# Patient Record
Sex: FEMALE | Race: OTHER | NOT HISPANIC OR LATINO | Employment: UNEMPLOYED | URBAN - METROPOLITAN AREA
[De-identification: names, ages, dates, MRNs, and addresses within clinical notes are randomized per-mention and may not be internally consistent; named-entity substitution may affect disease eponyms.]

---

## 2017-01-04 ENCOUNTER — OFFICE VISIT (OUTPATIENT)
Dept: MEDICAL GROUP | Facility: MEDICAL CENTER | Age: 27
End: 2017-01-04
Attending: NURSE PRACTITIONER
Payer: MEDICAID

## 2017-01-04 VITALS
BODY MASS INDEX: 21.71 KG/M2 | DIASTOLIC BLOOD PRESSURE: 68 MMHG | SYSTOLIC BLOOD PRESSURE: 100 MMHG | WEIGHT: 115 LBS | HEIGHT: 61 IN | HEART RATE: 88 BPM | OXYGEN SATURATION: 98 % | TEMPERATURE: 97.6 F | RESPIRATION RATE: 20 BRPM

## 2017-01-04 DIAGNOSIS — Z30.011 ENCOUNTER FOR INITIAL PRESCRIPTION OF CONTRACEPTIVE PILLS: ICD-10-CM

## 2017-01-04 LAB
INT CON NEG: NORMAL
INT CON POS: NORMAL
POC URINE PREGNANCY TEST: NORMAL

## 2017-01-04 PROCEDURE — 99203 OFFICE O/P NEW LOW 30 MIN: CPT | Mod: 25 | Performed by: NURSE PRACTITIONER

## 2017-01-04 PROCEDURE — 99203 OFFICE O/P NEW LOW 30 MIN: CPT | Performed by: NURSE PRACTITIONER

## 2017-01-04 PROCEDURE — 81025 URINE PREGNANCY TEST: CPT | Performed by: NURSE PRACTITIONER

## 2017-01-04 RX ORDER — NORETHINDRONE ACETATE AND ETHINYL ESTRADIOL 1MG-20(21)
1 KIT ORAL DAILY
Qty: 28 TAB | Refills: 11 | Status: SHIPPED | OUTPATIENT
Start: 2017-01-04 | End: 2017-12-13 | Stop reason: SDUPTHER

## 2017-01-04 ASSESSMENT — PATIENT HEALTH QUESTIONNAIRE - PHQ9: CLINICAL INTERPRETATION OF PHQ2 SCORE: 0

## 2017-01-04 NOTE — PROGRESS NOTES
Chief Complaint   Patient presents with   • Establish Care   • Contraception     discuss birth control     Shanique Garcia is a 26 y.o. female here to establish care.    HPI:  The patient is in the clinic today to establish care. She states she has been healthy throughout her lifetime and has no chronic medical problems. She has not had regular care with a PCP. 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 She reports she had 3 healthy pregnancies. She lives with her SO and children.      She is interested in starting on birth control pills today. She is in a committed relationship but states that she does not desire to become pregnant for at least 3 years. She has been on pills in the past and this worked well for her. She states her LMP was 12.17.16 but she has had unprotected sex since this time. She is a nonsmoker. She has no personal or family history of thromboembolism. Last Pap smear 3/2015-no endocervical cells present but otherwise normal. She states she is still breastfeeding her youngest son who is about 16 months old. She does plan to wean him soon as she wants to return to work part time.        Current medicines (including changes today)  No current outpatient prescriptions on file.     No current facility-administered medications for this visit.     She  has a past medical history of Pyelonephritis affecting pregnancy in third trimester (7/3/2015). She also has no past medical history of Addisons disease ([facility]), Adrenal disorder ([facility]), Allergy, Anemia, Anxiety, Arrhythmia, Arthritis, ASTHMA, Blood transfusion, Cancer ([facility]), CATARACT, CHF (congestive heart failure) ([facility]), Clotting disorder ([facility]), COPD, Cushings syndrome ([facility]), Depression, Diabetes, Diabetic neuropathy ([facility]), EMPHYSEMA, GERD (gastroesophageal reflux disease), Glaucoma, Goiter, Headache(784.0), Heart attack ([facility]), Heart murmur, HIV (human immunodeficiency virus infection), Hyperlipidemia, Parathyroid disorder ([facility]),  "Hypertension, IBD (inflammatory bowel disease), Meningitis, Migraine, Muscle disorder, OSTEOPOROSIS, Pituitary disease (HCC), Seizure (HCC), Sickle cell disease (HCC), Stroke (HCC), Substance abuse, Thyroid disease, Tuberculosis, Ulcer (HCC), or Urinary tract infection, site not specified.  She  has no past surgical history on file.  Social History   Substance Use Topics   • Smoking status: Never Smoker    • Smokeless tobacco: Never Used   • Alcohol Use: No     Social History     Social History Narrative     Family History   Problem Relation Age of Onset   • Cancer Maternal Grandfather      lung cancer    • Diabetes Neg Hx    • Heart Disease Neg Hx    • Stroke Neg Hx      Family Status   Relation Status Death Age   • Maternal Grandfather     • Mother Alive    • Father Alive    • Brother Alive    • Maternal Grandmother Alive    • Paternal Grandmother Alive    • Paternal Grandfather           No current medications. NKDA.      ROS:   No fever, chills, sweats.  No polydipsia, polyuria, temperature intolerance, significant weight changes  No visual changes, blurred vision.  No chest pain, palpitations, peripheral swelling  No chronic cough, shortness of breath, dyspnea with exertion.  No dysphagia, odynophagia, black or bloody stools.  No abdominal pain, nausea, persistent diarrhea, constipation.   No dysuria, nocturia, hematuria, incontinence.  No rash, pruritis, pigment changes.  No persistent swollen glands, unusual bruising, lymphedema.  No focal weakness, syncope.  No joint swelling, muscle weakness or spasm  No chronic insomnia, depression, anxiety or other mood disorder.           Objective:     Blood pressure 100/68, pulse 88, temperature 36.4 °C (97.6 °F), resp. rate 20, height 1.549 m (5' 1\"), weight 52.164 kg (115 lb), last menstrual period 2016, SpO2 98 %, currently breastfeeding. Body mass index is 21.74 kg/(m^2).  Physical Exam:    Alert, oriented in no acute distress.  Eye contact is " good, speech goal directed, affect bright.  HEENT: EOMI, PERRL, conjunctiva non-injected, sclera non-icteric.  Marie pinnae, external auditory canals, TM pearly gray with normal light reflex bilaterally.  Oral mucous membranes pink and moist with no lesions.  Neck supple with no cervical lymphadenopathy, JVD, palpable thyroid nodules   Lungs: clear to auscultation bilaterally with good excursion.  CV: regular rate and rhythm.  Abdomen soft, non-distended, non-tender with normal bowel sounds. No CVAT. Striae noted from previous pregnancies.  Lower extremities color normal, vascularity normal, no edema, temperature normal  Skin: no rashes or lesions in visible areas.  MSK: full ROM in 4 extremities. Normal gait. No joint swelling/deformity.       Urine pregnancy test done in clinic-negative.      Assessment and Plan:   The following treatment plan was discussed     1. Encounter for initial prescription of contraceptive pills  Options for birth control discussed. She elects to restart pill.  She is still breastfeeding but her menstrual cycle has resumed and she plans to wean soon so combined pill will be chosen. She was educated that this may decrease her mild supply some. She is okay with this if it occurs.    Rx for Loestrin given today.  Discussed dosing/side effects. Pt instructed to call clinic with any adverse effects    Instructions (including written) were given on different start methods and need for backup contraception depending on start method.    She was educated that OCPs do not protect against STIs and she needs to continue to use condoms with sexual activity.  She was told that the OCPs will not be effective for 7 days after starting.  She was told that some medications can alter the effectiveness of OCPs and she is to discuss this with a pharmacist if she is prescribed a medication/antibiotic.  She was educated that abnormal bleeding or spotting is common in the first couple of months on birth  control.  She was educated in risk and s/s of thromboembolism and understands that she is to seek emergency care if these occur.    norethindrone-ethinyl estradiol (LOESTRIN FE 1/20) 1-20 MG-MCG per tablet         She will be scheduled to update Pap smear.      Followup: Return in about 4 weeks (around 2/1/2017), or if symptoms worsen or fail to improve.         Please note that this dictation was created using voice recognition software. I have made every reasonable attempt to correct obvious errors, but I expect that there are errors of grammar and possibly content that I did not discover before finalizing the note.

## 2017-01-04 NOTE — PATIENT INSTRUCTIONS
Please take all medications as instructed.  If you have any adverse reactions, please report them immediately.  Please follow all instructions that were discussed in the visit.  Please complete any ordered blood work prior to next visit.  Please follow up with any referrals made.

## 2017-01-04 NOTE — MR AVS SNAPSHOT
"Shanique Garcia   2017 9:10 AM   Office Visit   MRN: [id number]    [facility]   Dept Phone:  [phone]    Description:  Female : 1990   Provider:  DAILY Rivera           Reason for Visit     Establish Care     Contraception discuss birth control      Allergies as of 2017     No Known Allergies      You were diagnosed with     Encounter for initial prescription of contraceptive pills   [280862]         Vital Signs     Blood Pressure Pulse Temperature Respirations Height Weight    100/68 mmHg 88 36.4 °C (97.6 °F) 20 1.549 m (5' 1\") 52.164 kg (115 lb)    Body Mass Index Oxygen Saturation Last Menstrual Period Breastfeeding? Smoking Status       21.74 kg/m2 98% 2016 Yes Never Smoker        Basic Information     Date Of Birth Sex Race Ethnicity Preferred Language    1990 Female Other Non- English      Your appointments     2017  9:10 AM   Established Patient with DAILY Rivera   [facility] ([facility])    [address] [place] [id number]   [phone]           You will be receiving a confirmation call a few days before your appointment from our automated call confirmation system.              Health Maintenance        Date Due Completion Dates    IMM HEP B VACCINE (1 of 3 - Primary Series) 1990 ---    IMM HEP A VACCINE (1 of 2 - Standard Series) 10/10/1993 ---    IMM HPV VACCINE (1 of 3 - Female 3 Dose Series) 10/10/2003 ---    IMM VARICELLA (CHICKENPOX) VACCINE (1 of 2 - 2 Dose Adolescent Series) 10/10/2005 ---    IMM INFLUENZA (1) 2016 11/3/2013    PAP SMEAR 3/19/2018 3/19/2015    IMM DTaP/Tdap/Td Vaccine (2 - Td) 2025, 11/3/2013            Current Immunizations     Influenza Vaccine Quad Inj (Pf) 11/3/2013  9:15 AM    Tdap Vaccine 2015, 11/3/2013  9:15 AM      Below and/or attached are the medications your provider expects you to take. Review all of your home " medications and newly ordered medications with your provider and/or pharmacist. Follow medication instructions as directed by your provider and/or pharmacist. Please keep your medication list with you and share with your provider. Update the information when medications are discontinued, doses are changed, or new medications (including over-the-counter products) are added; and carry medication information at all times in the event of emergency situations     Allergies:  No Known Allergies          Medications  Valid as of: January 04, 2017 -  9:20 AM    Generic Name Brand Name Tablet Size Instructions for use    Norethin Ace-Eth Estrad-FE (Tab) JUNEL FE 1/20 1-20 MG-MCG Take 1 Tab by mouth every day.        .                 Medicines prescribed today were sent to:     [facility] [address] [place] [address], [place] [address]     [address] [facility] [place] [address]    Phone: [phone] Fax: [phone]    Open 24 Hours?: No      Medication refill instructions:       If your prescription bottle indicates you have medication refills left, it is not necessary to call your provider’s office. Please contact your pharmacy and they will refill your medication.    If your prescription bottle indicates you do not have any refills left, you may request refills at any time through one of the following ways: The online Takeaway.[email or web address] system (except Urgent Care), by calling your provider’s office, or by asking your pharmacy to contact your provider’s office with a refill request. Medication refills are processed only during regular business hours and may not be available until the next business day. Your provider may request additional information or to have a follow-up visit with you prior to refilling your medication.   *Please Note: Medication refills are assigned a new Rx number when refilled electronically. Your pharmacy may indicate that no refills were authorized even though a new prescription for the same medication is available at the  pharmacy. Please request the medicine by name with the pharmacy before contacting your provider for a refill.           [employer] Access Code: [email or web address]  Expires: 2/3/2017  9:20 AM    [employer]  A secure, online tool to manage your health information     [employer]’s [employer]® is a secure, online tool that connects you to your personalized health information from the privacy of your home -- day or night - making it very easy for you to manage your healthcare. Once the activation process is completed, you can even access your medical information using the [employer] rosaura, which is available for free in the Apple Rosaura store or Google Play store.     [employer] provides the following levels of access (as shown below):   My Chart Features   [facility]  Specialists [facility]  Urgent  Care [facility]  Primary Care  Doctor   Email your healthcare team securely and privately 24/7 X X X    Manage appointments: schedule your next appointment; view details of past/upcoming appointments X      Request prescription refills. X      View recent personal medical records, including lab and immunizations X X X X   View health record, including health history, allergies, medications X X X X   Read reports about your outpatient visits, procedures, consult and ER notes X X X X   See your discharge summary, which is a recap of your hospital and/or ER visit that includes your diagnosis, lab results, and care plan. X X       How to register for [employer]:  1. Go to  [email or web address].  2. Click on the Sign Up Now box, which takes you to the New Member Sign Up page. You will need to provide the following information:  a. Enter your [employer] Access Code exactly as it appears at the top of this page. (You will not need to use this code after you’ve completed the sign-up process. If you do not sign up before the expiration date, you must request a new code.)   b. Enter your date of birth.   c. Enter your home email  address.   d. Click Submit, and follow the next screen’s instructions.  3. Create a [employer] ID. This will be your Sidewalk login ID and cannot be changed, so think of one that is secure and easy to remember.  4. Create a [employer] password. You can change your password at any time.  5. Enter your Password Reset Question and Answer. This can be used at a later time if you forget your password.   6. Enter your e-mail address. This allows you to receive e-mail notifications when new information is available in Sidewalk.  7. Click Sign Up. You can now view your health information.    For assistance activating your Sidewalk account, call [phone]

## 2017-02-08 ENCOUNTER — OFFICE VISIT (OUTPATIENT)
Dept: MEDICAL GROUP | Facility: MEDICAL CENTER | Age: 27
End: 2017-02-08
Attending: NURSE PRACTITIONER
Payer: MEDICAID

## 2017-02-08 ENCOUNTER — HOSPITAL ENCOUNTER (OUTPATIENT)
Facility: MEDICAL CENTER | Age: 27
End: 2017-02-08
Attending: NURSE PRACTITIONER
Payer: MEDICAID

## 2017-02-08 VITALS
HEIGHT: 61 IN | WEIGHT: 116 LBS | TEMPERATURE: 98.4 F | HEART RATE: 100 BPM | BODY MASS INDEX: 21.9 KG/M2 | SYSTOLIC BLOOD PRESSURE: 98 MMHG | RESPIRATION RATE: 14 BRPM | DIASTOLIC BLOOD PRESSURE: 60 MMHG | OXYGEN SATURATION: 96 %

## 2017-02-08 DIAGNOSIS — Z12.4 SCREENING FOR CERVICAL CANCER: ICD-10-CM

## 2017-02-08 DIAGNOSIS — Z01.419 ENCOUNTER FOR GYNECOLOGICAL EXAMINATION WITHOUT ABNORMAL FINDING: ICD-10-CM

## 2017-02-08 DIAGNOSIS — Z30.41 ORAL CONTRACEPTIVE PILL SURVEILLANCE: ICD-10-CM

## 2017-02-08 LAB — CYTOLOGY REG CYTOL: NORMAL

## 2017-02-08 PROCEDURE — G0101 CA SCREEN;PELVIC/BREAST EXAM: HCPCS | Performed by: NURSE PRACTITIONER

## 2017-02-08 PROCEDURE — 88175 CYTOPATH C/V AUTO FLUID REDO: CPT

## 2017-02-08 PROCEDURE — 99212 OFFICE O/P EST SF 10 MIN: CPT | Performed by: NURSE PRACTITIONER

## 2017-02-08 ASSESSMENT — PAIN SCALES - GENERAL: PAINLEVEL: NO PAIN

## 2017-02-08 NOTE — MR AVS SNAPSHOT
"        Shanique Garcia   2017 9:10 AM   Office Visit   MRN: [id number]    Department[facility]   Dept Phone:  [phone]    Description:  Female : 1990   Provider:  DAILY Rivera           Reason for Visit     Gynecologic Exam           Allergies as of 2017     No Known Allergies      You were diagnosed with     Encounter for gynecological examination without abnormal finding   [953789]       Screening for cervical cancer   [507508]       Oral contraceptive pill surveillance   [[id number]]         Vital Signs     Blood Pressure Pulse Temperature Respirations Height Weight    98/60 mmHg 100 36.9 °C (98.4 °F) 14 1.549 m (5' 1\") 52.617 kg (116 lb)    Body Mass Index Oxygen Saturation Last Menstrual Period Breastfeeding? Smoking Status       21.93 kg/m2 96% 2017 No Never Smoker        Basic Information     Date Of Birth Sex Race Ethnicity Preferred Language    1990 Female Other Non- English      Problem List              ICD-10-CM Priority Class Noted - Resolved    Oral contraceptive pill surveillance Z30.41   2017 - Present      Health Maintenance        Date Due Completion Dates    IMM HEP B VACCINE (1 of 3 - Primary Series) 1990 ---    IMM HEP A VACCINE (1 of 2 - Standard Series) 10/10/1993 ---    IMM HPV VACCINE (1 of 3 - Female 3 Dose Series) 10/10/2003 ---    IMM VARICELLA (CHICKENPOX) VACCINE (1 of 2 - 2 Dose Adolescent Series) 10/10/2005 ---    IMM INFLUENZA (1) 2017 (Originally 2016) 11/3/2013    PAP SMEAR 3/19/2018 3/19/2015    IMM DTaP/Tdap/Td Vaccine (2 - Td) 2025, 11/3/2013            Current Immunizations     Influenza Vaccine Quad Inj (Pf) 11/3/2013  9:15 AM    Tdap Vaccine 2015, 11/3/2013  9:15 AM      Below and/or attached are the medications your provider expects you to take. Review all of your home medications and newly ordered medications with your provider and/or pharmacist. Follow medication " instructions as directed by your provider and/or pharmacist. Please keep your medication list with you and share with your provider. Update the information when medications are discontinued, doses are changed, or new medications (including over-the-counter products) are added; and carry medication information at all times in the event of emergency situations     Allergies:  No Known Allergies          Medications  Valid as of: February 08, 2017 - 10:06 AM    Generic Name Brand Name Tablet Size Instructions for use    Norethin Ace-Eth Estrad-FE (Tab) JUNEL FE 1/20 1-20 MG-MCG Take 1 Tab by mouth every day.        .                 Medicines prescribed today were sent to:     [facility] [address] [place] [address], [place] - [address]    [address] [facility] [place] [address]    Phone: [phone] Fax: [phone]    Open 24 Hours?: No      Medication refill instructions:       If your prescription bottle indicates you have medication refills left, it is not necessary to call your provider’s office. Please contact your pharmacy and they will refill your medication.    If your prescription bottle indicates you do not have any refills left, you may request refills at any time through one of the following ways: The online [employer] system (except Urgent Care), by calling your provider’s office, or by asking your pharmacy to contact your provider’s office with a refill request. Medication refills are processed only during regular business hours and may not be available until the next business day. Your provider may request additional information or to have a follow-up visit with you prior to refilling your medication.   *Please Note: Medication refills are assigned a new Rx number when refilled electronically. Your pharmacy may indicate that no refills were authorized even though a new prescription for the same medication is available at the pharmacy. Please request the medicine by name with the pharmacy before contacting your provider for a  refill.        Your To Do List     Future Labs/Procedures Complete By Expires    THINPREP PAP,REFLEX HPV ON ASC-US ONLY  As directed 2/8/2018      Instructions    Please take all medications as instructed.  If you have any adverse reactions, please report them immediately.  Please follow all instructions that were discussed in the visit.  Please complete any ordered blood work prior to next visit.  Please follow up with any referrals made.            Ghostruck Access Code: [email or web address]  Expires: 3/10/2017 10:06 AM    Ghostruck  A secure, online tool to manage your health information     [employer]’s Ghostruck® is a secure, online tool that connects you to your personalized health information from the privacy of your home -- day or night - making it very easy for you to manage your healthcare. Once the activation process is completed, you can even access your medical information using the Ghostruck rosaura, which is available for free in the Apple Rosaura store or Google Play store.     Ghostruck provides the following levels of access (as shown below):   My Chart Features   [facility] Doctor [facility]  Specialists [facility]  Urgent  Care Non-[facility]  Primary Care  Doctor   Email your healthcare team securely and privately 24/7 X X X    Manage appointments: schedule your next appointment; view details of past/upcoming appointments X      Request prescription refills. X      View recent personal medical records, including lab and immunizations X X X X   View health record, including health history, allergies, medications X X X X   Read reports about your outpatient visits, procedures, consult and ER notes X X X X   See your discharge summary, which is a recap of your hospital and/or ER visit that includes your diagnosis, lab results, and care plan. X X       How to register for Ghostruck:  1. Go to  [email or web address].  2. Click on the Sign Up Now box, which takes you to the New Member Sign Up page. You will need to  provide the following information:  a. Enter your Cours[employer] Access Code exactly as it appears at the top of this page. (You will not need to use this code after you’ve completed the sign-up process. If you do not sign up before the expiration date, you must request a new code.)   b. Enter your date of birth.   c. Enter your home email address.   d. Click Submit, and follow the next screen’s instructions.  3. Create a Coursmos ID. This will be your Coursmos login ID and cannot be changed, so think of one that is secure and easy to remember.  4. Create a Coursmos password. You can change your password at any time.  5. Enter your Password Reset Question and Answer. This can be used at a later time if you forget your password.   6. Enter your e-mail address. This allows you to receive e-mail notifications when new information is available in Coursmos.  7. Click Sign Up. You can now view your health information.    For assistance activating your Coursmos account, call [phone]

## 2017-02-08 NOTE — PROGRESS NOTES
SUBJECTIVE: 26 y.o. female for annual routine gynecologic exam  Chief Complaint   Patient presents with   • Gynecologic Exam       Pt in clinic today for a well woman's exam. Pt stating that she has been feeling well. No vaginal DC/odor, urinary symptoms. Denies bloating, pelvic pain. Pt is currently sexually active with her . Last pap smear: , normal (although no endocervical cells noted).  She states her periods are regular and occur every 28 days. Last menstrual period: 17. She denies h/o abnormal pap smear. No h/o sexually transmitted infections. Current birth control/contraception method: OCPs, she started on Loestrin at her last appt and is tolerating it well. Last mammogram: N/A. Pt does perform monthly self breast exams. No breast tenderness, mass, nipple discharge, changes in size or contour. Last DEXA: N/A. Colon ca screening: N/A.       Obstetric History       T2      TAB0   SAB0   E0   M1   L3         She  reports that she has never smoked. She has never used smokeless tobacco.    LMP Date: 17      ROS:    No abdominal pain, change in bowel habits, black or bloody stools.    No unusual headaches, no visual changes  No prolonged cough. No dyspnea or chest pain on exertion.  No depression, labile mood, anxiety ,libido changes, insomnia.  No new/concerning skin lesions    Further ROS as documented above in my HPI    Preventive Care:  Health Maintenance Topics with due status: Overdue       Topic Date Due    IMM HEP B VACCINE 1992    IMM HEP A VACCINE 10/10/1993    IMM HPV VACCINE 10/10/2003    IMM VARICELLA (CHICKENPOX) VACCINE 10/10/2005       Current medications, allergies, and problem list reviewed with patient and updated in [facility].    Family History:   Family History   Problem Relation Age of Onset   • Cancer Maternal Grandfather      lung cancer    • Diabetes Neg Hx    • Heart Disease Neg Hx    • Stroke Neg Hx           OBJECTIVE:   BP 98/60 mmHg  Pulse 100   "Temp(Src) 36.9 °C (98.4 °F)  Resp 14  Ht 1.549 m (5' 1\")  Wt 52.617 kg (116 lb)  BMI 21.93 kg/m2  SpO2 96%  LMP 01/25/2017  Breastfeeding? No  Body mass index is 21.93 kg/(m^2).    Head and neck:  Neck supple. No adenopathy or masses in the neck or supraclavicular regions. No thyromegaly.   Neuro: Alert and oriented.  Chest:  Clear, good air entry, no wheezes or rales.   Heart:  Regular rate and rhythm.  S1 and S2 normal.  No edema or JVD.   Abdomen:  Soft without tenderness, guarding, mass or organomegaly.  No CVA tenderness or inguinal adenopathy.   Extremities:  Extremities, reflexes and peripheral pulses are normal.   Skin: color normal, vascularity normal, no edema, temperature normal   No rashes or suspicious skin lesions noted.  Breast Exam: Breast exam completed. Symmetrical. No dimpling. No nodules palpated.  No axillary/supraclavicular lymphadenopathy  Pelvic Exam -  Normal external genitalia with no lesions. Normal vaginal mucosa with normal rugation and scant discharge. Cervix with no visible lesions, friable. No cervical motion tenderness. Uterus is normal sized with no masses. No adnexal tenderness or enlargement appreciated. specimen obtained and sent to lab      Assessment and Plan    1. Encounter for gynecological examination without abnormal finding     2. Screening for cervical cancer  THINPREP PAP,REFLEX HPV ON ASC-US ONLY   3. Oral contraceptive pill surveillance  She is doing well on the Loestrin, continue.         Follow-up: routine FU as scheduled.     Current screening recommendations reviewed with patient  "

## 2017-12-13 ENCOUNTER — OFFICE VISIT (OUTPATIENT)
Dept: MEDICAL GROUP | Facility: MEDICAL CENTER | Age: 27
End: 2017-12-13
Attending: INTERNAL MEDICINE
Payer: COMMERCIAL

## 2017-12-13 VITALS
HEART RATE: 110 BPM | DIASTOLIC BLOOD PRESSURE: 70 MMHG | BODY MASS INDEX: 25.68 KG/M2 | WEIGHT: 136 LBS | TEMPERATURE: 98.6 F | OXYGEN SATURATION: 97 % | SYSTOLIC BLOOD PRESSURE: 110 MMHG | RESPIRATION RATE: 16 BRPM | HEIGHT: 61 IN

## 2017-12-13 DIAGNOSIS — Z30.41 ORAL CONTRACEPTIVE PILL SURVEILLANCE: ICD-10-CM

## 2017-12-13 PROCEDURE — 99214 OFFICE O/P EST MOD 30 MIN: CPT | Performed by: INTERNAL MEDICINE

## 2017-12-13 PROCEDURE — 99212 OFFICE O/P EST SF 10 MIN: CPT | Performed by: INTERNAL MEDICINE

## 2017-12-13 RX ORDER — NORETHINDRONE ACETATE AND ETHINYL ESTRADIOL 1MG-20(21)
1 KIT ORAL DAILY
Qty: 28 TAB | Refills: 11 | Status: SHIPPED | OUTPATIENT
Start: 2017-12-13 | End: 2018-01-18 | Stop reason: SDUPTHER

## 2017-12-13 ASSESSMENT — PAIN SCALES - GENERAL: PAINLEVEL: NO PAIN

## 2017-12-13 ASSESSMENT — PATIENT HEALTH QUESTIONNAIRE - PHQ9: CLINICAL INTERPRETATION OF PHQ2 SCORE: 0

## 2017-12-13 NOTE — PROGRESS NOTES
"Subjective:   Shanique Garcia is a 27 y.o. female here today for Birth control refill, establish care    Oral contraceptive pill surveillance  Patient reports that she has been on birth control pills in the past and has liked this method of contraception. She has been off for about a year. She is currently sexually active with one male partner and does not use anything for birth control at this point. Her last menstrual period ended 3 days ago. She denies any abnormal vaginal bleeding or discharge.       Current medicines (including changes today)  Current Outpatient Prescriptions   Medication Sig Dispense Refill   • norethindrone-ethinyl estradiol (LOESTRIN FE 1/20) 1-20 MG-MCG per tablet Take 1 Tab by mouth every day. 28 Tab 11     No current facility-administered medications for this visit.      She  has a past medical history of Pyelonephritis affecting pregnancy in third trimester (7/3/2015). She also has no past medical history of CHF (congestive heart failure) (CMS-HCC); Clotting disorder (CMS-HCC); COPD; Cushings syndrome (CMS-[facility]); Depression; Diabetes; Diabetic neuropathy (CMS-[facility]); EMPHYSEMA; GERD (gastroesophageal reflux disease); Headache(784.0); Heart attack; Heart murmur; HIV (human immunodeficiency virus infection); Hyperlipidemia; Hypertension; IBD (inflammatory bowel disease); Meningitis; Migraine; Muscle disorder; OSTEOPOROSIS; Parathyroid disorder (CMS-[facility]); Pituitary disease (CMS-[facility]); Seizure (CMS-[facility]); Sickle cell disease (CMS-HCC); Stroke (CMS-HCC); Substance abuse; Thyroid disease; Tuberculosis; Ulcer (CMS-[facility]); or Urinary tract infection, site not specified.    ROS   As above in HPI  Denies chest pain, shortness of breath, abdominal pain, leg swelling     Objective:     Blood pressure 110/70, pulse (!) 110, temperature 37 °C (98.6 °F), resp. rate 16, height 1.549 m (5' 1\"), weight 61.7 kg (136 lb), SpO2 97 %, not currently breastfeeding. Body mass index is 25.7 kg/m².   Physical " Exam:  Constitutional: Alert, no distress.  Skin: Warm, dry, good turgor, no rashes in visible areas.  Eye: Equal, round and reactive, conjunctiva clear, lids normal.  ENMT: Lips without lesions, good dentition, oropharynx clear, TM's clear bilaterally  Neck: Trachea midline, no masses, no thyromegaly. No cervical or supraclavicular lymphadenopathy  Respiratory: Unlabored respiratory effort, lungs clear to auscultation, no wheezes, no ronchi.  Cardiovascular: Normal S1, S2, no murmur, no edema.  Abdomen: Soft, non-tender, no masses, no hepatosplenomegaly.  Psych: Alert and oriented x3, normal affect and mood.      Assessment and Plan:   The following treatment plan was discussed    1. Oral contraceptive pill surveillance  Will restart patient on OCPs. Pregnancy test not done as she just ended her menstrual cycle 3 days ago. I discussed with her that she can start the pills right away. We discussed the importance of taking them daily at about the same time every day, taking 2 pills the next day if she misses a dose, but not taking more than 2 pills on a given day. We discussed using backup birth control for the next week and on any days when she misses a pill. She expressed understanding.  - norethindrone-ethinyl estradiol (LOESTRIN FE 1/20) 1-20 MG-MCG per tablet; Take 1 Tab by mouth every day.  Dispense: 28 Tab; Refill: 11      Followup: Return in about 1 year (around 12/13/2018), or if symptoms worsen or fail to improve.

## 2017-12-13 NOTE — ASSESSMENT & PLAN NOTE
Patient reports that she has been on birth control pills in the past and has liked this method of contraception. She has been off for about a year. She is currently sexually active with one male partner and does not use anything for birth control at this point. Her last menstrual period ended 3 days ago. She denies any abnormal vaginal bleeding or discharge.

## 2018-01-18 DIAGNOSIS — Z30.41 ORAL CONTRACEPTIVE PILL SURVEILLANCE: ICD-10-CM

## 2018-01-18 RX ORDER — NORETHINDRONE ACETATE AND ETHINYL ESTRADIOL 1MG-20(21)
1 KIT ORAL DAILY
Qty: 28 TAB | Refills: 11 | Status: SHIPPED | OUTPATIENT
Start: 2018-01-18 | End: 2018-12-04 | Stop reason: SDUPTHER

## 2018-01-18 NOTE — TELEPHONE ENCOUNTER
Was the patient seen in the last year in this department? Yes     Does patient have an active prescription for medications requested? Yes     Received Request Via: Pharmacy

## 2018-12-04 ENCOUNTER — OFFICE VISIT (OUTPATIENT)
Dept: MEDICAL GROUP | Facility: MEDICAL CENTER | Age: 28
End: 2018-12-04
Attending: INTERNAL MEDICINE
Payer: COMMERCIAL

## 2018-12-04 VITALS
HEIGHT: 61 IN | HEART RATE: 78 BPM | OXYGEN SATURATION: 96 % | SYSTOLIC BLOOD PRESSURE: 100 MMHG | DIASTOLIC BLOOD PRESSURE: 60 MMHG | BODY MASS INDEX: 28.7 KG/M2 | WEIGHT: 152 LBS | TEMPERATURE: 98.9 F | RESPIRATION RATE: 16 BRPM

## 2018-12-04 DIAGNOSIS — Z30.41 ORAL CONTRACEPTIVE PILL SURVEILLANCE: ICD-10-CM

## 2018-12-04 DIAGNOSIS — Z00.00 WELL ADULT EXAM: ICD-10-CM

## 2018-12-04 PROCEDURE — G0438 PPPS, INITIAL VISIT: HCPCS | Performed by: INTERNAL MEDICINE

## 2018-12-04 PROCEDURE — 99212 OFFICE O/P EST SF 10 MIN: CPT | Performed by: INTERNAL MEDICINE

## 2018-12-04 RX ORDER — NORETHINDRONE ACETATE AND ETHINYL ESTRADIOL 1MG-20(21)
1 KIT ORAL DAILY
Qty: 28 TAB | Refills: 11 | Status: SHIPPED | OUTPATIENT
Start: 2018-12-04 | End: 2019-11-07 | Stop reason: SDUPTHER

## 2018-12-04 ASSESSMENT — PAIN SCALES - GENERAL: PAINLEVEL: NO PAIN

## 2018-12-04 NOTE — ASSESSMENT & PLAN NOTE
She currently has no health concerns.  She is up-to-date on her Pap smear.  She declines flu shot today.  Otherwise, she is up-to-date on all of her health maintenance topics.  She denies chest pain, shortness breath, abdominal pain, dysphagia, dysuria, abnormal bowel movements.

## 2018-12-04 NOTE — PROGRESS NOTES
"Subjective:   Shanique Garcia is a 28 y.o. female here today for Refill OCP, yearly checkup     Oral contraceptive pill surveillance  She presents today for refill of her OCPs.  She is doing well on her current medication but ran out of refills.  Her last visit in clinic was approximately 1 year ago.  States that she had unprotected sex and was off of her pills about a week ago.  She took Plan B the next morning.  Her last menstrual period was around 11/15/18.    Well adult exam  She currently has no health concerns.  She is up-to-date on her Pap smear.  She declines flu shot today.  Otherwise, she is up-to-date on all of her health maintenance topics.  She denies chest pain, shortness breath, abdominal pain, dysphagia, dysuria, abnormal bowel movements.       Current medicines (including changes today)  Current Outpatient Prescriptions   Medication Sig Dispense Refill   • norethindrone-ethinyl estradiol (LOESTRIN FE 1/20) 1-20 MG-MCG per tablet Take 1 Tab by mouth every day. 28 Tab 11     No current facility-administered medications for this visit.      She  has a past medical history of OCP (oral contraceptive pills) initiation.    ROS   As above in HPI     Objective:     Blood pressure 100/60, pulse 78, temperature 37.2 °C (98.9 °F), temperature source Temporal, resp. rate 16, height 1.549 m (5' 0.98\"), weight 68.9 kg (152 lb), SpO2 96 %, not currently breastfeeding. Body mass index is 28.74 kg/m².   Physical Exam:  Constitutional: Alert, no distress.  Skin: Warm, dry, good turgor, no rashes in visible areas.  Eye: Equal, round and reactive, conjunctiva clear, lids normal.  ENMT: Lips without lesions, good dentition, oropharynx clear, TM's clear bilaterally  Neck: Trachea midline, no masses, no thyromegaly. No cervical or supraclavicular lymphadenopathy  Respiratory: Unlabored respiratory effort, lungs clear to auscultation, no wheezes, no ronchi.  Cardiovascular: Regular rate and rhythm, no murmurs " appreciated, no lower extremity edema  Psych: Alert and oriented x3, normal affect and mood.      Assessment and Plan:   The following treatment plan was discussed    1. Oral contraceptive pill surveillance  - norethindrone-ethinyl estradiol (LOESTRIN FE 1/20) 1-20 MG-MCG per tablet; Take 1 Tab by mouth every day.  Dispense: 28 Tab; Refill: 11    2. Well adult exam  She will return in 1 year or sooner if needed for her yearly physical exam.      Followup: Return in about 1 year (around 12/4/2019), or if symptoms worsen or fail to improve, for annual.

## 2018-12-04 NOTE — ASSESSMENT & PLAN NOTE
She presents today for refill of her OCPs.  She is doing well on her current medication but ran out of refills.  Her last visit in clinic was approximately 1 year ago.  States that she had unprotected sex and was off of her pills about a week ago.  She took Plan B the next morning.  Her last menstrual period was around 11/15/18.

## 2019-11-07 DIAGNOSIS — Z30.41 ORAL CONTRACEPTIVE PILL SURVEILLANCE: ICD-10-CM

## 2019-11-07 RX ORDER — NORETHINDRONE ACETATE AND ETHINYL ESTRADIOL AND FERROUS FUMARATE 1MG-20(21)
KIT ORAL
Qty: 28 TAB | Refills: 3 | Status: SHIPPED | OUTPATIENT
Start: 2019-11-07 | End: 2020-02-28

## 2020-02-27 ENCOUNTER — TELEPHONE (OUTPATIENT)
Dept: RADIOLOGY | Facility: MEDICAL CENTER | Age: 30
End: 2020-02-27

## 2020-04-19 DIAGNOSIS — Z30.41 ORAL CONTRACEPTIVE PILL SURVEILLANCE: ICD-10-CM

## 2020-04-21 RX ORDER — NORETHINDRONE ACETATE AND ETHINYL ESTRADIOL AND FERROUS FUMARATE 1MG-20(21)
KIT ORAL
Qty: 28 TAB | Refills: 0 | OUTPATIENT
Start: 2020-04-21

## 2020-04-30 ENCOUNTER — TELEMEDICINE (OUTPATIENT)
Dept: OBGYN | Facility: CLINIC | Age: 30
End: 2020-04-30
Payer: COMMERCIAL

## 2020-04-30 DIAGNOSIS — Z30.09 FAMILY PLANNING: ICD-10-CM

## 2020-04-30 DIAGNOSIS — Z30.09 GENERAL COUNSELING AND ADVICE ON CONTRACEPTIVE MANAGEMENT: ICD-10-CM

## 2020-04-30 DIAGNOSIS — Z30.41 ORAL CONTRACEPTIVE PILL SURVEILLANCE: ICD-10-CM

## 2020-04-30 PROCEDURE — 99203 OFFICE O/P NEW LOW 30 MIN: CPT | Mod: 95,CR | Performed by: OBSTETRICS & GYNECOLOGY

## 2020-04-30 RX ORDER — NORETHINDRONE ACETATE AND ETHINYL ESTRADIOL .02; 1 MG/1; MG/1
1 TABLET ORAL DAILY
Qty: 28 TAB | Refills: 2 | Status: SHIPPED | OUTPATIENT
Start: 2020-04-30 | End: 2020-08-20

## 2020-04-30 NOTE — PROGRESS NOTES
Telemedicine Visit: Established Patient     This encounter was conducted via Zoom .   Verbal consent was obtained. Patient's identity was verified.    Subjective:   CC: Family planning  Shanique Garcia is a 29 y.o. female presenting for evaluation and management of: G3, P3 last seen in our office 5 years ago.  Presents complaining of desire for contraception and family-planning.  Patient has a stable relationship she is monogamous.  Patient has been on Junel 1/20 and doing well.  She has menstrual cycles every month and her last menstrual cycle was  On  lasting 3 to 4 days.  Patient denies any gynecologic problems.  Past OB history  x3    Last Pap smear 3 years ago-normal      Past medical history-negative  Past surgical history-negative  No known drug allergies.          ROS all systems reviewed and negative.  Denies any recent fevers or chills. No nausea or vomiting. No chest pains or shortness of breath.     No Known Allergies    Current medicines (including changes today)  Current Outpatient Medications   Medication Sig Dispense Refill   • norethindrone-ethinyl estradiol (JUNEL 1/20) 1-20 MG-MCG per tablet Take 1 Tab by mouth every day. 28 Tab 2   • JUNEL FE 1/20 1-20 MG-MCG per tablet Take 1 tablet by mouth once daily 28 Tab 1     No current facility-administered medications for this visit.        Patient Active Problem List    Diagnosis Date Noted   • Well adult exam 2018   • Oral contraceptive pill surveillance 2017       Family History   Problem Relation Age of Onset   • Cancer Maternal Grandfather         lung cancer    • Diabetes Neg Hx    • Heart Disease Neg Hx    • Stroke Neg Hx        She  has a past medical history of OCP (oral contraceptive pills) initiation.  She  has no past surgical history on file.       Objective:   Vitals obtained by patient:  Height: 5 feet 2 inches, respirations 17/min, weight 131 pounds    Physical Exam: As noted  Constitutional: Alert, no  distress, well-groomed.  Skin: No rashes in visible areas.  Eye: Round. Conjunctiva clear, lids normal. No icterus.   ENMT: Lips pink without lesions, good dentition, moist mucous membranes. Phonation normal.  Neck: No masses, no thyromegaly. Moves freely without pain.  CV: Pulse as reported by patient  Respiratory: Unlabored respiratory effort, no cough or audible wheeze  Psych: Alert and oriented x3, normal affect and mood.       Assessment and Plan:   The following treatment plan was discussed:     1. Oral contraceptive pill surveillance    2. Family planning    3. General counseling and advice on contraceptive management    Other orders  - norethindrone-ethinyl estradiol (JUNEL 1/20) 1-20 MG-MCG per tablet; Take 1 Tab by mouth every day.  Dispense: 28 Tab; Refill: 2    Discussed proper use of oral contraceptives.  I counseled the patient that she should get a pregnancy test to make sure she is not pregnant even though she is having regular menstrual cycles just to be careful.  The patient has been counseled that she needs a Pap smear as soon as she can get in to the office because her last Pap smear was 3 years ago.  In addition, the patient needs a blood pressure check for proper maintenance of this oral contraceptive  All questions answered in detail    Follow-up: No follow-ups on file.

## 2020-06-08 ENCOUNTER — TELEPHONE (OUTPATIENT)
Dept: MEDICAL GROUP | Facility: MEDICAL CENTER | Age: 30
End: 2020-06-08

## 2020-06-08 NOTE — TELEPHONE ENCOUNTER
Tried calling patient to schedule NP appt from [facility] group wait list. Left detailed msg and to call back to schedule

## 2020-08-18 ENCOUNTER — TELEMEDICINE (OUTPATIENT)
Dept: OBGYN | Facility: CLINIC | Age: 30
End: 2020-08-18
Payer: COMMERCIAL

## 2020-08-18 DIAGNOSIS — Z30.41 ORAL CONTRACEPTIVE PILL SURVEILLANCE: ICD-10-CM

## 2020-08-18 PROCEDURE — 99212 OFFICE O/P EST SF 10 MIN: CPT | Mod: 95 | Performed by: OBSTETRICS & GYNECOLOGY

## 2020-08-18 RX ORDER — NORETHINDRONE ACETATE AND ETHINYL ESTRADIOL 1MG-20(21)
KIT ORAL
Qty: 28 TAB | Refills: 1 | Status: SHIPPED | OUTPATIENT
Start: 2020-08-18 | End: 2020-08-20 | Stop reason: SDUPTHER

## 2020-08-20 ENCOUNTER — GYNECOLOGY VISIT (OUTPATIENT)
Dept: OBGYN | Facility: CLINIC | Age: 30
End: 2020-08-20
Payer: COMMERCIAL

## 2020-08-20 ENCOUNTER — HOSPITAL ENCOUNTER (OUTPATIENT)
Facility: MEDICAL CENTER | Age: 30
End: 2020-08-20
Attending: OBSTETRICS & GYNECOLOGY
Payer: COMMERCIAL

## 2020-08-20 VITALS — WEIGHT: 147 LBS | BODY MASS INDEX: 27.79 KG/M2 | SYSTOLIC BLOOD PRESSURE: 128 MMHG | DIASTOLIC BLOOD PRESSURE: 78 MMHG

## 2020-08-20 DIAGNOSIS — Z11.3 SCREENING EXAMINATION FOR VENEREAL DISEASE: ICD-10-CM

## 2020-08-20 DIAGNOSIS — Z12.4 SCREENING FOR CERVICAL CANCER: ICD-10-CM

## 2020-08-20 DIAGNOSIS — Z11.51 SCREENING FOR HUMAN PAPILLOMAVIRUS (HPV): ICD-10-CM

## 2020-08-20 DIAGNOSIS — Z30.41 ORAL CONTRACEPTIVE PILL SURVEILLANCE: ICD-10-CM

## 2020-08-20 DIAGNOSIS — Z30.41 ENCOUNTER FOR SURVEILLANCE OF CONTRACEPTIVE PILLS: ICD-10-CM

## 2020-08-20 DIAGNOSIS — Z01.419 WELL WOMAN EXAM WITH ROUTINE GYNECOLOGICAL EXAM: ICD-10-CM

## 2020-08-20 PROCEDURE — 87491 CHLMYD TRACH DNA AMP PROBE: CPT

## 2020-08-20 PROCEDURE — 99395 PREV VISIT EST AGE 18-39: CPT | Performed by: OBSTETRICS & GYNECOLOGY

## 2020-08-20 PROCEDURE — 88175 CYTOPATH C/V AUTO FLUID REDO: CPT

## 2020-08-20 PROCEDURE — 87591 N.GONORRHOEAE DNA AMP PROB: CPT

## 2020-08-20 RX ORDER — NORETHINDRONE ACETATE AND ETHINYL ESTRADIOL 1MG-20(21)
KIT ORAL
Qty: 84 TAB | Refills: 4 | Status: SHIPPED | OUTPATIENT
Start: 2020-08-20 | End: 2021-09-07 | Stop reason: SDUPTHER

## 2020-08-20 NOTE — PROGRESS NOTES
Subjective:      ELIZABETH Garcia is a 29 y.o. female who presents with Gynecologic Exam        CC: annual exam    HPI: 29-year-old  3 para 3-0-0-3, last menstrual period 2020, using Junel for contraception, presents for annual exam.  Menses are monthly, lasting 4 to 5 days, with no intermenstrual bleeding.  She has no history of abnormal Pap smears or STDs.  Family history is negative for breast cancer.  She denies vaginal discharge.    Past Medical History:   Diagnosis Date   • OCP (oral contraceptive pills) initiation        History reviewed. No pertinent surgical history.      Current Outpatient Medications:   •  norethindrone-ethinyl estradiol (JUNEL FE 1/20) 1-20 MG-MCG per tablet, Take 1 tablet by mouth once daily, Disp: 84 Tab, Rfl: 4    Patient has no known allergies.    Family History   Problem Relation Age of Onset   • Cancer Maternal Grandfather         lung cancer    • Diabetes Neg Hx    • Heart Disease Neg Hx    • Stroke Neg Hx        Social History     Socioeconomic History   • Marital status: Single     Spouse name: Not on file   • Number of children: Not on file   • Years of education: Not on file   • Highest education level: Not on file   Occupational History   • Not on file   Social Needs   • Financial resource strain: Not on file   • Food insecurity     Worry: Not on file     Inability: Not on file   • Transportation needs     Medical: Not on file     Non-medical: Not on file   Tobacco Use   • Smoking status: Never Smoker   • Smokeless tobacco: Never Used   Substance and Sexual Activity   • Alcohol use: No   • Drug use: No   • Sexual activity: Yes     Partners: Male     Birth control/protection: Pill   Lifestyle   • Physical activity     Days per week: Not on file     Minutes per session: Not on file   • Stress: Not on file   Relationships   • Social connections     Talks on phone: Not on file     Gets together: Not on file     Attends Sikh service: Not on file     Active  member of club or organization: Not on file     Attends meetings of clubs or organizations: Not on file     Relationship status: Not on file   • Intimate partner violence     Fear of current or ex partner: Not on file     Emotionally abused: Not on file     Physically abused: Not on file     Forced sexual activity: Not on file   Other Topics Concern   • Not on file   Social History Narrative   • Not on file       OB History    Para Term  AB Living   3 2 2 0 0 3   SAB TAB Ectopic Molar Multiple Live Births   0 0 0 0 1 3       ROS REVIEW OF SYSTEMS:    Pertinent positives and negatives mentioned in HPI.    All other systems reviewed and are negative or noncontributory.       Objective:     /78   Wt 66.7 kg (147 lb)   LMP 2020   BMI 27.79 kg/m²      Physical Exam      GENERAL: Alert, in no apparent distress  PSYCHIATRIC: Appropriate affect, intact insight and judgement.  NECK:  Nontender, no masses.  No Thyromegaly or nodules. No lymphadenopathy.  RESPIRATORY: Normal respiratory effort.  Lungs clear to auscultation.   CARDIOVASCULAR: RRR, no murmur, gallop, or rub.  ABDOMEN: Soft, nontender, nondistended.  No palpable masses.  No rebound or guarding.  No inguinal lymphadenopathy.  No hepatosplenomegaly.  No hernias.  BACK: No CVA tenderness  EXTREMITIES: No edema  SKIN: No rash    BREAST: No masses or tenderness.  No skin changes.  No nipple inversion or discharge. No axillary lymphadenopathy.      GENITOURINARY:  Normal external genitalia, no lesions.  Normal urethral meatus, no masses or tenderness.  Normal bladder without fullness or masses.  Vagina well estrogenized, white vaginal discharge consistent with BV is noted.  Cervix without lesions or discharge, nontender.  Uterus normal size, shape, and contour, nontender.  Adnexa nontender, no masses.  Normal anus and perineum.    Rectal Exam - not indicated.       Assessment/Plan:        1. Well woman exam with routine gynecological  exam  Reviewed monthly self breast exams and need for yearly screening mammograms starting at age 40.  Discussed calcium intake, Vitamin D,  and weight bearing exercise for bone health.     2. Screening for cervical cancer  - THINPREP RFLX HPV ASCUS W/CTNG; Future    3. Screening for human papillomavirus (HPV)  - THINPREP RFLX HPV ASCUS W/CTNG; Future    4. Screening examination for venereal disease  - THINPREP RFLX HPV ASCUS W/CTNG; Future    5. Oral contraceptive pill surveillance  No contraindications present.  She denies migraine with aura, hypertension, DVT, liver dysfunction, tobacco use.  Reviewed risks of DVT.  - norethindrone-ethinyl estradiol (JUNEL FE 1/20) 1-20 MG-MCG per tablet; Take 1 tablet by mouth once daily  Dispense: 84 Tab; Refill: 4    F/U 1 year or prn

## 2020-08-21 LAB
C TRACH DNA GENITAL QL NAA+PROBE: NEGATIVE
CYTOLOGY REG CYTOL: NORMAL
N GONORRHOEA DNA GENITAL QL NAA+PROBE: NEGATIVE
SPECIMEN SOURCE: NORMAL

## 2021-09-07 DIAGNOSIS — Z30.41 ORAL CONTRACEPTIVE PILL SURVEILLANCE: ICD-10-CM

## 2021-09-13 RX ORDER — NORETHINDRONE ACETATE AND ETHINYL ESTRADIOL 1MG-20(21)
KIT ORAL
Qty: 84 TABLET | Refills: 4 | Status: SHIPPED | OUTPATIENT
Start: 2021-09-13 | End: 2022-10-06 | Stop reason: SDUPTHER

## 2021-11-30 NOTE — PROGRESS NOTES
Please notify pt she's positive for BV and yeast.  Please give her an rx for flagyl 500mg po bid for 7 days and rx for diflucan 200mg take one tab today and repeat in 7 days on last day of flagyl.  Dr Torres  Telemedicine Visit: Established Patient     This evaluation was conducted via Zoom, using secure and encrypted videoconferencing technology. The patient's identity was confirmed and verbal consent for the telemedicine encounter was obtained.      Subjective:   CC: Needs refill of OCPs  Shanique Garcia is a 29 y.o. female presents via exam for refill of OCPs.  The patient is taking Junel for contraception.  Her OCPs were refilled in April 2020 with a limited supply, and instructions to follow-up for a Pap smear, as it has been 3-1/2 years since her last Pap smear.  She denies history of migraine with aura, DVT, hypertension, liver dysfunction.  She has no other complaints today.        ROS as per HPI    No Known Allergies    Current medicines (including changes today)  Current Outpatient Medications   Medication Sig Dispense Refill   • norethindrone-ethinyl estradiol (JUNEL FE 1/20) 1-20 MG-MCG per tablet Take 1 tablet by mouth once daily 28 Tab 1   • norethindrone-ethinyl estradiol (JUNEL 1/20) 1-20 MG-MCG per tablet Take 1 Tab by mouth every day. 28 Tab 2     No current facility-administered medications for this visit.        Patient Active Problem List    Diagnosis Date Noted   • Well adult exam 12/04/2018   • Oral contraceptive pill surveillance 02/08/2017       Family History   Problem Relation Age of Onset   • Cancer Maternal Grandfather         lung cancer    • Diabetes Neg Hx    • Heart Disease Neg Hx    • Stroke Neg Hx        She  has a past medical history of OCP (oral contraceptive pills) initiation.  She  has no past surgical history on file.       Objective:   There were no vitals taken for this visit.    Physical Exam:  Constitutional: Alert, no distress, well-groomed.  Skin: No rashes in visible areas.  Eye: Round. Conjunctiva clear, lids normal. No icterus.   ENMT: Lips pink without lesions, good dentition, moist mucous membranes. Phonation normal.  Neck: No masses, no thyromegaly. Moves  freely without pain.  Respiratory: Unlabored respiratory effort, no cough or audible wheeze  Psych: Alert and oriented x3, normal affect and mood.       Assessment and Plan:   The following treatment plan was discussed:     1. Oral contraceptive pill surveillance  - norethindrone-ethinyl estradiol (JUNEL FE 1/20) 1-20 MG-MCG per tablet; Take 1 tablet by mouth once daily  Dispense: 28 Tab; Refill: 1    OCPs refilled.  No contraindications present.  Patient instructed to call for an appointment for an annual exam to get Pap smear up-to-date.    Follow-up: No follow-ups on file.

## 2022-10-06 DIAGNOSIS — Z30.41 ORAL CONTRACEPTIVE PILL SURVEILLANCE: ICD-10-CM

## 2022-10-06 RX ORDER — NORETHINDRONE ACETATE AND ETHINYL ESTRADIOL 1MG-20(21)
KIT ORAL
Qty: 84 TABLET | Refills: 4 | Status: SHIPPED | OUTPATIENT
Start: 2022-10-06 | End: 2023-11-06

## 2022-10-19 ENCOUNTER — GYNECOLOGY VISIT (OUTPATIENT)
Dept: OBGYN | Facility: CLINIC | Age: 32
End: 2022-10-19
Payer: COMMERCIAL

## 2022-10-19 DIAGNOSIS — F43.23 SITUATIONAL MIXED ANXIETY AND DEPRESSIVE DISORDER: ICD-10-CM

## 2022-10-19 DIAGNOSIS — Z11.3 ROUTINE SCREENING FOR STI (SEXUALLY TRANSMITTED INFECTION): ICD-10-CM

## 2022-10-19 PROCEDURE — 99212 OFFICE O/P EST SF 10 MIN: CPT | Performed by: OBSTETRICS & GYNECOLOGY

## 2022-10-19 RX ORDER — HYDROXYZINE HYDROCHLORIDE 25 MG/1
25 TABLET, FILM COATED ORAL 3 TIMES DAILY PRN
Qty: 30 TABLET | Refills: 0 | Status: SHIPPED | OUTPATIENT
Start: 2022-10-19

## 2022-10-19 NOTE — PROGRESS NOTES
S: Patient presents today with chief complaint of anxiety.  She states this started after the Labor Day holiday when her  received a letter from  with a woman's name on it.  She then became obsessed with the idea that he was cheating on her.  Her  is currently at home taking care of the kids and does not work outside of the house.  She states that she has constant thoughts throughout the day along the lines of him cheating on her.  She has spoken to him about this and he has denied these allegations.    Vitals:    10/19/22 1402   BP: (P) 133/85     Gen: AAO in NAD  Psych: mood is anxious and tearful   Ext: normal range of motion.     AP: 32 y.o.  here with anxiety  Offered patient STI screening which she declines today.  Call [facility] for an appointment which patient accepts today.  Atarax 50mg TID prn anxiety given to pt. Advised it will make her sedated.

## 2024-10-14 DIAGNOSIS — Z30.41 ORAL CONTRACEPTIVE PILL SURVEILLANCE: ICD-10-CM

## 2024-10-15 RX ORDER — NORETHINDRONE ACETATE AND ETHINYL ESTRADIOL 1MG-20(21)
KIT ORAL
Qty: 84 TABLET | Refills: 3 | Status: SHIPPED | OUTPATIENT
Start: 2024-10-15

## 2024-11-27 ENCOUNTER — GYNECOLOGY VISIT (OUTPATIENT)
Dept: OBGYN | Facility: CLINIC | Age: 34
End: 2024-11-27
Payer: COMMERCIAL

## 2024-11-27 ENCOUNTER — HOSPITAL ENCOUNTER (OUTPATIENT)
Facility: MEDICAL CENTER | Age: 34
End: 2024-11-27
Attending: PHYSICIAN ASSISTANT
Payer: COMMERCIAL

## 2024-11-27 VITALS — SYSTOLIC BLOOD PRESSURE: 125 MMHG | WEIGHT: 153 LBS | DIASTOLIC BLOOD PRESSURE: 81 MMHG | BODY MASS INDEX: 28.92 KG/M2

## 2024-11-27 DIAGNOSIS — Z01.419 WELL WOMAN EXAM: ICD-10-CM

## 2024-11-27 DIAGNOSIS — Z12.4 SCREENING FOR CERVICAL CANCER: ICD-10-CM

## 2024-11-27 DIAGNOSIS — Z30.41 ORAL CONTRACEPTIVE PILL SURVEILLANCE: ICD-10-CM

## 2024-11-27 PROCEDURE — 88142 CYTOPATH C/V THIN LAYER: CPT

## 2024-11-27 RX ORDER — NORETHINDRONE ACETATE AND ETHINYL ESTRADIOL 1MG-20(21)
KIT ORAL
Qty: 84 TABLET | Refills: 3 | Status: SHIPPED | OUTPATIENT
Start: 2024-11-27

## 2024-11-27 NOTE — PROGRESS NOTES
ANNUAL GYNECOLOGY VISIT    Chief Complaint  Annual    Subjective  Shanique Garcia is a 34 y.o. female  Patient's last menstrual period was 10/14/2024. using OCP for contraception who presents today for Annual Exam.        Preventive Care   Immunization History   Administered Date(s) Administered    Influenza Vaccine Quad Inj (Pf) 2013    Tdap Vaccine 2013, 2015       Guardasil HPV vaccine: unknown    Gynecology History and ROS  Current Sexual Activity: yes - monogamous male partner   History of sexually transmitted diseases? no  Abnormal discharge? No, does have intermittent itching   Current Contraception:  OCP    Menstrual History  Patient's last menstrual period was 10/14/2024.  Periods are regular  q 28 days, lasting 3-4 days.   Clots or heavy flow: no  Dysmenorrhea: no  Intermenstrual bleeding/spotting: no  Significant pain with periods:no  Bothersome PMS symptoms: no  Significant Pelvic Pain: no    Pap History  Last pap smear: 2020  History of moderate or severe dysplasia: no    Cancer Risk Assessement:  Family history of:   - Breast cancer: no   - Ovarian cancer: no   - Uterine cancer: no   - Colon cancer: no    Obstetric History  OB History    Para Term  AB Living   3 3 3     3   SAB IAB Ectopic Molar Multiple Live Births           0 3      # Outcome Date GA Lbr Chava/2nd Weight Sex Type Anes PTL Lv   3 Term 07/08/15 38w2d  6 lb 11.1 oz M Vag-Spont EPI N ZACHARY   2 Term 13 40w0d  6 lb M Vag-Spont EPI N ZACHARY      Birth Comments: Pt states no complaints   1 Term 11/06/10 40w0d   M Vag-Spont None  ZACHARY       Past Medical History  Past Medical History:   Diagnosis Date    OCP (oral contraceptive pills) initiation        Past Surgical History  History reviewed. No pertinent surgical history.    Social History  Social History     Tobacco Use    Smoking status: Never    Smokeless tobacco: Never   Substance Use Topics    Alcohol use: No    Drug use: No         Family History  Family History   Problem Relation Age of Onset    Cancer Maternal Grandfather         lung cancer     Diabetes Neg Hx     Heart Disease Neg Hx     Stroke Neg Hx        Home Medications  Current Outpatient Medications   Medication Sig    norethindrone-ethinyl estradiol (WILLIE FE 1/20) 1-20 MG-MCG per tablet Take 1 tablet by mouth once daily    hydrOXYzine HCl (ATARAX) 25 MG Tab Take 1 Tablet by mouth 3 times a day as needed for Anxiety. (Patient not taking: Reported on 11/27/2024)       Allergies/Reactions  No Known Allergies    ROS  Positive ROS: none  Gen: no fevers or chills, no significant weight loss or gain, excessive fatigue  Respiratory:  no cough or dyspnea  Cardiac:  no chest pain, no palpitations, no syncope  Breast: no breast discharge, pain, lump or skin changes  GI:  no heartburn, no abdominal pain, no nausea or vomiting  Urinary: no dysuria, urgency, frequency, incontinence   Psych: no depression or anxiety  Neuro: no migraines with aura, fainting spells, numbness or tingling  Extremities: no joint pain, persistently swollen ankles, recurrent leg cramps      Physical Examination:  Vital Signs: /81 (BP Location: Right arm, Patient Position: Sitting, BP Cuff Size: Large adult)   Wt 153 lb   LMP 10/14/2024   BMI 28.92 kg/m²       Constitutional: The patient is well developed and well nourished.  Psychiatric: Patient is oriented to time place and person.   Skin: No rash observed.  Neck: Appears symmetric. Thyroid normal size  Respiratory: normal effort  Breast: Inspection reveals no asymetry or nipple discharge, no skin thickening, dimpling or erythema.  Palpation demonstrates no masses.  Abdomen: Soft, non-tender.  Pelvic Exam:      Vulva: external female genitalia are normal in appearance. No lesions     Urethra - no lesions, no erythema     Vagina: moist, pink, normal ruggae     Cervix: pink, smooth, no lesions, no CMT     Uterus - non-tender, normal size, shape, contour,  mobile, anteverted     Ovaries: non-tender, no appreciable masses    Pap Smear performed: Yes    Chaperone Present: SUMIT Pretty  Extremeties: Legs are symmetric and without tenderness. There is no edema present.        Assessment & Plan  Shanique Garcia is a 34 y.o. female who presents today for Annual Gyn Exam.     1. Well woman exam    - Anticipatory guidance given. Encouraged adequate water intake, healthy diet, regular exercise. Educated on Pap smear screening and guidelines for age per ACOG and ASSCP. Discussed safe sex, STI prevention, contraception/family planning. Self breast exam taught.     2. Screening for cervical cancer    - THINPREP PAP WITH HPV; Future        Return: Annually or PRN    BLAKE RibeiroAHuey-C.  [facility]

## 2024-11-27 NOTE — PROGRESS NOTES
Patient here for annual exam.   Last pap done/results : 08/21/2020 WNL   LMP : 10/14/2024  BCM : Pill  Last Mammogram, if applicable: NA  Pt states no concerns   Phone/Pharmacy verified

## 2024-12-06 LAB
HPV I/H RISK 1 DNA SPEC QL NAA+PROBE: NOT DETECTED
SPECIMEN SOURCE: NORMAL
THINPREP PAP, CYTOLOGY NL11781: NORMAL